# Patient Record
Sex: MALE | Race: BLACK OR AFRICAN AMERICAN | NOT HISPANIC OR LATINO | Employment: FULL TIME | URBAN - METROPOLITAN AREA
[De-identification: names, ages, dates, MRNs, and addresses within clinical notes are randomized per-mention and may not be internally consistent; named-entity substitution may affect disease eponyms.]

---

## 2022-11-21 ENCOUNTER — TELEPHONE (OUTPATIENT)
Dept: FAMILY MEDICINE | Facility: CLINIC | Age: 47
End: 2022-11-21
Payer: COMMERCIAL

## 2022-11-21 ENCOUNTER — TELEPHONE (OUTPATIENT)
Dept: CARDIOLOGY | Facility: CLINIC | Age: 47
End: 2022-11-21
Payer: COMMERCIAL

## 2022-11-21 NOTE — TELEPHONE ENCOUNTER
----- Message from Gerald Knight sent at 11/21/2022  1:49 PM CST -----  Type:  Sooner Apoointment Request    Caller is requesting a sooner appointment.  Caller declined first available appointment listed below.  Caller will not accept being placed on the waitlist and is requesting a message be sent to doctor.  Name of Caller:Timmons Wanda     When is the first available appointment?    Symptoms: Hospital Follow up  in 2 days / Discharge on 11/20/22       Would the patient rather a call back or a response via [facility]? Call back     Best Call Back Number:[phone] (mobile)     Additional Information:self pay Pt

## 2022-11-21 NOTE — TELEPHONE ENCOUNTER
Spoke with patient he stated he will look and see who can get him an appointment with the next two days (hospital follow up)

## 2022-11-21 NOTE — TELEPHONE ENCOUNTER
----- Message from Kael Tang sent at 11/21/2022  3:02 PM CST -----  Contact: self  Type: Sooner Appointment Request        Caller is requesting a sooner appointment. Caller declined first available appointment listed below. Caller will not accept being placed on the waitlist and is requesting a message be sent to doctor.        Name of Caller: Patient   Best Call Back Number: [phone]  Additional Information: Pt needs a ER follow up plz schedule him no dates were shown to schedule. Thanks

## 2022-11-22 ENCOUNTER — TELEPHONE (OUTPATIENT)
Dept: UROLOGY | Facility: CLINIC | Age: 47
End: 2022-11-22
Payer: COMMERCIAL

## 2022-11-22 NOTE — TELEPHONE ENCOUNTER
TAWNYA Agar[facility]; [facility]ONSO [facility]ANTOS Staff  Caller: self (Yesterday,  3:02 PM)  Type: Sooner Appointment Request         Caller is requesting a sooner appointment. Caller declined first available appointment listed below. Caller will not accept being placed on the waitlist and is requesting a message be sent to doctor.         Name of Caller: Patient   Best Call Back Number: [phone]   Additional Information: Pt needs a ER follow up plz schedule him no dates were shown to schedule. Thanks pt stated his appointment today was supposed to be with Dr. Arango in Urology and Not cardio. He asked me to cancel his appointment for today because it is not needed.

## 2022-12-07 ENCOUNTER — LAB VISIT (OUTPATIENT)
Dept: LAB | Facility: HOSPITAL | Age: 47
End: 2022-12-07
Attending: UROLOGY
Payer: COMMERCIAL

## 2022-12-07 ENCOUNTER — OFFICE VISIT (OUTPATIENT)
Dept: UROLOGY | Facility: CLINIC | Age: 47
End: 2022-12-07
Payer: COMMERCIAL

## 2022-12-07 VITALS — WEIGHT: 250 LBS | BODY MASS INDEX: 40.18 KG/M2 | HEIGHT: 66 IN

## 2022-12-07 DIAGNOSIS — N52.9 IMPOTENCE: ICD-10-CM

## 2022-12-07 DIAGNOSIS — R36.1 HEMATOSPERMIA: Primary | ICD-10-CM

## 2022-12-07 DIAGNOSIS — Z12.5 SCREENING FOR PROSTATE CANCER: ICD-10-CM

## 2022-12-07 LAB
BILIRUBIN, UA POC OHS: NEGATIVE
BLOOD, UA POC OHS: NEGATIVE
CLARITY, UA POC OHS: CLEAR
COLOR, UA POC OHS: YELLOW
COMPLEXED PSA SERPL-MCNC: 0.91 NG/ML (ref 0–4)
GLUCOSE, UA POC OHS: NEGATIVE
KETONES, UA POC OHS: NEGATIVE
LEUKOCYTES, UA POC OHS: NEGATIVE
NITRITE, UA POC OHS: NEGATIVE
PH, UA POC OHS: 7.5
PROTEIN, UA POC OHS: NEGATIVE
SPECIFIC GRAVITY, UA POC OHS: 1.02
UROBILINOGEN, UA POC OHS: 1

## 2022-12-07 PROCEDURE — 99999 PR PBB SHADOW E&M-EST. PATIENT-LVL II: CPT | Mod: PBBFAC,,, | Performed by: UROLOGY

## 2022-12-07 PROCEDURE — 81003 PR URINALYSIS, AUTO, W/O SCOPE: ICD-10-PCS | Mod: QW,S$GLB,, | Performed by: UROLOGY

## 2022-12-07 PROCEDURE — 84153 ASSAY OF PSA TOTAL: CPT | Performed by: UROLOGY

## 2022-12-07 PROCEDURE — 99203 PR OFFICE/OUTPT VISIT, NEW, LEVL III, 30-44 MIN: ICD-10-PCS | Mod: S$GLB,ICN,, | Performed by: UROLOGY

## 2022-12-07 PROCEDURE — 81003 URINALYSIS AUTO W/O SCOPE: CPT | Mod: QW,S$GLB,, | Performed by: UROLOGY

## 2022-12-07 PROCEDURE — 36415 COLL VENOUS BLD VENIPUNCTURE: CPT | Mod: PO | Performed by: UROLOGY

## 2022-12-07 PROCEDURE — 99999 PR PBB SHADOW E&M-EST. PATIENT-LVL II: ICD-10-PCS | Mod: PBBFAC,,, | Performed by: UROLOGY

## 2022-12-07 PROCEDURE — 81003 URINALYSIS AUTO W/O SCOPE: CPT | Mod: PBBFAC,PO | Performed by: UROLOGY

## 2022-12-07 PROCEDURE — 99212 OFFICE O/P EST SF 10 MIN: CPT | Mod: PBBFAC,PO | Performed by: UROLOGY

## 2022-12-07 PROCEDURE — 99203 OFFICE O/P NEW LOW 30 MIN: CPT | Mod: S$GLB,ICN,, | Performed by: UROLOGY

## 2022-12-07 RX ORDER — SILDENAFIL 100 MG/1
50 TABLET, FILM COATED ORAL DAILY PRN
Qty: 10 TABLET | Refills: 11 | Status: SHIPPED | OUTPATIENT
Start: 2022-12-07 | End: 2023-12-07

## 2022-12-07 NOTE — PROGRESS NOTES
Subjective:       Patient ID: Iain Muñoz is a 47 y.o. male.    Chief Complaint: Other (Blood in semen )    HPI    47 year old with hematospermia.  He was seen in the emergency room.  This was his 1st episode and it has now resolved..  He was otherwise having no other symptoms.  He denies gross hematuria and dysuria.  He is not had any previous urinary tract infections or prostate infections.  His urinalysis is clear today.  He has no family history of prostate cancer.  He does have ED.  He is used Viagra in the past.      Urine dipstick shows negative for all components.    No past medical history on file.    No past surgical history on file.      Current Outpatient Medications:     fish oil-omega-3 fatty acids 300-1,000 mg capsule, Take by mouth once daily., Disp: , Rfl:     sildenafiL (VIAGRA) 100 MG tablet, Take 0.5 tablets (50 mg total) by mouth daily as needed for Erectile Dysfunction., Disp: 10 tablet, Rfl: 11    Review of Systems   Constitutional:  Negative for fever.   Eyes:  Negative for visual disturbance.   Respiratory:  Negative for shortness of breath.    Cardiovascular:  Negative for chest pain.   Gastrointestinal:  Negative for nausea.   Genitourinary:  Negative for dysuria and hematuria.   Musculoskeletal:  Negative for gait problem.   Skin:  Negative for rash.   Neurological:  Negative for seizures.   Psychiatric/Behavioral:  Negative for confusion.      Objective:      Physical Exam  Vitals reviewed.   Constitutional:       Appearance: He is well-developed.   HENT:      Head: Normocephalic and atraumatic.   Eyes:      Conjunctiva/sclera: Conjunctivae normal.   Cardiovascular:      Rate and Rhythm: Normal rate.   Pulmonary:      Effort: Pulmonary effort is normal.   Genitourinary:     Penis: Normal.       Testes: Normal.      Prostate: Enlarged (20g, s/s/a). Not tender.      Rectum: No mass. Normal anal tone.   Musculoskeletal:         General: Normal range of motion.   Skin:     General:  Skin is warm and dry.      Findings: No rash.   Neurological:      Mental Status: He is alert and oriented to person, place, and time.       Assessment:       1. Hematospermia    2. Impotence    3. Screening for prostate cancer        Plan:       Hematospermia  -     POCT Urinalysis(Instrument)    Impotence  -     POCT Urinalysis(Instrument)    Screening for prostate cancer  -     PSA, Screening; Future; Expected date: 03/07/2023  -     POCT Urinalysis(Instrument)    Other orders  -     sildenafiL (VIAGRA) 100 MG tablet; Take 0.5 tablets (50 mg total) by mouth daily as needed for Erectile Dysfunction.  Dispense: 10 tablet; Refill: 11      Reassurance.  No treatment recommended.  I will get baseline PSA.  Refill Viagra